# Patient Record
Sex: FEMALE | Race: AMERICAN INDIAN OR ALASKA NATIVE | NOT HISPANIC OR LATINO | Employment: OTHER | URBAN - METROPOLITAN AREA
[De-identification: names, ages, dates, MRNs, and addresses within clinical notes are randomized per-mention and may not be internally consistent; named-entity substitution may affect disease eponyms.]

---

## 2017-04-04 ENCOUNTER — OUTPATIENT INFUSION SERVICES (OUTPATIENT)
Dept: ONCOLOGY | Facility: MEDICAL CENTER | Age: 77
End: 2017-04-04
Attending: INTERNAL MEDICINE
Payer: MEDICARE

## 2017-04-04 VITALS
RESPIRATION RATE: 18 BRPM | SYSTOLIC BLOOD PRESSURE: 151 MMHG | HEIGHT: 62 IN | TEMPERATURE: 98 F | DIASTOLIC BLOOD PRESSURE: 80 MMHG | BODY MASS INDEX: 26.61 KG/M2 | OXYGEN SATURATION: 96 % | HEART RATE: 44 BPM | WEIGHT: 144.62 LBS

## 2017-04-04 LAB
CA-I BLD ISE-SCNC: 1.16 MMOL/L (ref 1.1–1.3)
CREAT BLD-MCNC: 0.9 MG/DL (ref 0.5–1.4)

## 2017-04-04 PROCEDURE — 82565 ASSAY OF CREATININE: CPT

## 2017-04-04 PROCEDURE — 700111 HCHG RX REV CODE 636 W/ 250 OVERRIDE (IP): Performed by: INTERNAL MEDICINE

## 2017-04-04 PROCEDURE — 82330 ASSAY OF CALCIUM: CPT

## 2017-04-04 PROCEDURE — 96401 CHEMO ANTI-NEOPL SQ/IM: CPT

## 2017-04-04 RX ADMIN — DENOSUMAB 60 MG: 60 INJECTION SUBCUTANEOUS at 15:30

## 2017-04-04 ASSESSMENT — PAIN SCALES - GENERAL: PAINLEVEL: NO PAIN

## 2017-04-04 NOTE — PROGRESS NOTES
Pt is here foe her scheduled Prolia. Cr/iCa drawn from R AC with #24G needle; reviewed. Pt states being familiar with the medication and has tolerated it well in the past. Treatment completed without an incident. Discharged home to self care. Pt will schedule her next appointment as needed.

## 2017-04-04 NOTE — PROGRESS NOTES
Pharmacy Note:    Ca = 1.16 mmol/L  SCr = 0.9 mg/dL, est CrCl ~57 mL/min  Last Dose 9/6/16  Okay to receive Prolia today.    Echo Escobedo, PharmD

## 2017-09-08 ENCOUNTER — APPOINTMENT (OUTPATIENT)
Dept: SCHEDULING | Facility: IMAGING CENTER | Age: 77
End: 2017-09-08
Payer: MEDICARE

## 2017-09-25 DIAGNOSIS — C77.3 BREAST CANCER METASTASIZED TO AXILLARY LYMPH NODE, LEFT (HCC): ICD-10-CM

## 2017-09-25 DIAGNOSIS — C50.912 BREAST CANCER METASTASIZED TO AXILLARY LYMPH NODE, LEFT (HCC): ICD-10-CM

## 2017-09-25 PROBLEM — C50.919 BREAST CANCER METASTASIZED TO AXILLARY LYMPH NODE (HCC): Status: ACTIVE | Noted: 2017-09-25

## 2017-10-04 ENCOUNTER — OUTPATIENT INFUSION SERVICES (OUTPATIENT)
Dept: ONCOLOGY | Facility: MEDICAL CENTER | Age: 77
End: 2017-10-04
Attending: INTERNAL MEDICINE
Payer: MEDICARE

## 2017-10-04 VITALS
RESPIRATION RATE: 18 BRPM | SYSTOLIC BLOOD PRESSURE: 149 MMHG | HEART RATE: 76 BPM | OXYGEN SATURATION: 94 % | WEIGHT: 146.16 LBS | BODY MASS INDEX: 27.6 KG/M2 | TEMPERATURE: 97.7 F | DIASTOLIC BLOOD PRESSURE: 74 MMHG | HEIGHT: 61 IN

## 2017-10-04 DIAGNOSIS — C50.912 BREAST CANCER METASTASIZED TO AXILLARY LYMPH NODE, LEFT (HCC): ICD-10-CM

## 2017-10-04 DIAGNOSIS — C77.3 BREAST CANCER METASTASIZED TO AXILLARY LYMPH NODE, LEFT (HCC): ICD-10-CM

## 2017-10-04 LAB
CA-I BLD ISE-SCNC: 1.23 MMOL/L (ref 1.1–1.3)
CREAT BLD-MCNC: 0.7 MG/DL (ref 0.5–1.4)

## 2017-10-04 PROCEDURE — 82565 ASSAY OF CREATININE: CPT

## 2017-10-04 PROCEDURE — 96401 CHEMO ANTI-NEOPL SQ/IM: CPT

## 2017-10-04 PROCEDURE — 82330 ASSAY OF CALCIUM: CPT

## 2017-10-04 PROCEDURE — 700111 HCHG RX REV CODE 636 W/ 250 OVERRIDE (IP): Performed by: INTERNAL MEDICINE

## 2017-10-04 RX ADMIN — DENOSUMAB 60 MG: 60 INJECTION SUBCUTANEOUS at 15:44

## 2017-10-04 ASSESSMENT — PAIN SCALES - GENERAL: PAINLEVEL: 1=MINIMAL PAIN

## 2017-10-04 NOTE — PROGRESS NOTES
Pharmacy Note:    Scr = 0.7    with estimated CrCl ~73 ml/min  Ionized Serum Calcium =   1.23 mmol/L    Last given 4/4/17  OK to proceed with Prolia injection    Robb AlbertD

## 2017-10-05 NOTE — PROGRESS NOTES
Patient presents for Prolia injection. Reviewed plan of care, patient verbalizes understanding. Labs drawn as ordered. Results within parameters to proceed. Prolia given with no new patient complaints. Patient scheduled for next appointment and released in no acute distress.

## 2018-04-09 ENCOUNTER — OUTPATIENT INFUSION SERVICES (OUTPATIENT)
Dept: ONCOLOGY | Facility: MEDICAL CENTER | Age: 78
End: 2018-04-09
Attending: INTERNAL MEDICINE
Payer: MEDICARE

## 2018-04-09 VITALS
RESPIRATION RATE: 18 BRPM | TEMPERATURE: 98.9 F | BODY MASS INDEX: 27.22 KG/M2 | SYSTOLIC BLOOD PRESSURE: 112 MMHG | WEIGHT: 144.18 LBS | HEIGHT: 61 IN | DIASTOLIC BLOOD PRESSURE: 55 MMHG | OXYGEN SATURATION: 94 % | HEART RATE: 61 BPM

## 2018-04-09 DIAGNOSIS — C77.3 BREAST CANCER METASTASIZED TO AXILLARY LYMPH NODE, LEFT (HCC): ICD-10-CM

## 2018-04-09 DIAGNOSIS — C50.912 BREAST CANCER METASTASIZED TO AXILLARY LYMPH NODE, LEFT (HCC): ICD-10-CM

## 2018-04-09 LAB
CA-I BLD ISE-SCNC: 1.13 MMOL/L (ref 1.1–1.3)
CREAT BLD-MCNC: 0.6 MG/DL (ref 0.5–1.4)

## 2018-04-09 PROCEDURE — 82565 ASSAY OF CREATININE: CPT

## 2018-04-09 PROCEDURE — 82330 ASSAY OF CALCIUM: CPT

## 2018-04-09 PROCEDURE — 96401 CHEMO ANTI-NEOPL SQ/IM: CPT

## 2018-04-09 PROCEDURE — 700111 HCHG RX REV CODE 636 W/ 250 OVERRIDE (IP): Mod: JG | Performed by: INTERNAL MEDICINE

## 2018-04-09 RX ADMIN — DENOSUMAB 60 MG: 60 INJECTION SUBCUTANEOUS at 13:53

## 2018-04-09 ASSESSMENT — PAIN SCALES - GENERAL: PAINLEVEL: NO PAIN

## 2018-04-09 NOTE — PROGRESS NOTES
Pharmacy note  Cr = 0.6 mg/dL, CrCl ~ 72 ml/min (min SCr 0.7 mg/dL)  Ionized Ca = 1.13  Last Prolia 10/04/17  OK for denosumab (Prolia) 60 mg SQ today    Gen Kerns, PharmD, BCOP

## 2018-04-09 NOTE — PROGRESS NOTES
Pt arrived to [facility], ambulatory, for Prolia injection. Pt voices no complaints. Pt denies any recent or upcoming dental surgeries. Labs drawn and reviewed, pt within parameters to treat today. Prolia injected into the R-upper arm with no s/sx of adverse reaction. Pt left [facility] with no s/sx of distress. Follow up appointment confirmed.

## 2018-07-27 ENCOUNTER — TELEPHONE (OUTPATIENT)
Dept: RADIOLOGY | Facility: MEDICAL CENTER | Age: 78
End: 2018-07-27

## 2018-07-27 NOTE — TELEPHONE ENCOUNTER
ADVISED PT OF ABN POPUP ON DEXA ORDER (DIAGNOSIS CODE MAY NOT BE COVERED BY MEDICARE); I WILL TRY TO GET NEW CODE FROM PROVIDER'S OFFICE/TML

## 2018-08-03 ENCOUNTER — HOSPITAL ENCOUNTER (OUTPATIENT)
Dept: RADIOLOGY | Facility: MEDICAL CENTER | Age: 78
End: 2018-08-03
Attending: INTERNAL MEDICINE
Payer: MEDICARE

## 2018-08-03 DIAGNOSIS — Z78.0 POSTMENOPAUSAL: ICD-10-CM

## 2018-08-03 DIAGNOSIS — C50.412 MALIGNANT NEOPLASM OF UPPER-OUTER QUADRANT OF LEFT FEMALE BREAST, UNSPECIFIED ESTROGEN RECEPTOR STATUS (HCC): ICD-10-CM

## 2018-08-03 DIAGNOSIS — Z85.3 PERSONAL HISTORY OF MALIGNANT NEOPLASM OF BREAST: ICD-10-CM

## 2018-08-03 PROCEDURE — 77080 DXA BONE DENSITY AXIAL: CPT

## 2019-05-09 ENCOUNTER — OUTPATIENT INFUSION SERVICES (OUTPATIENT)
Dept: ONCOLOGY | Facility: MEDICAL CENTER | Age: 79
End: 2019-05-09
Attending: INTERNAL MEDICINE
Payer: MEDICARE

## 2019-05-09 VITALS
RESPIRATION RATE: 18 BRPM | HEIGHT: 61 IN | WEIGHT: 139.55 LBS | OXYGEN SATURATION: 93 % | HEART RATE: 64 BPM | BODY MASS INDEX: 26.35 KG/M2 | DIASTOLIC BLOOD PRESSURE: 71 MMHG | TEMPERATURE: 97.3 F | SYSTOLIC BLOOD PRESSURE: 135 MMHG

## 2019-05-09 DIAGNOSIS — C50.912 BREAST CANCER METASTASIZED TO AXILLARY LYMPH NODE, LEFT (HCC): ICD-10-CM

## 2019-05-09 DIAGNOSIS — C77.3 BREAST CANCER METASTASIZED TO AXILLARY LYMPH NODE, LEFT (HCC): ICD-10-CM

## 2019-05-09 LAB
CA-I BLD ISE-SCNC: 1.23 MMOL/L (ref 1.1–1.3)
CREAT BLD-MCNC: 0.7 MG/DL (ref 0.5–1.4)

## 2019-05-09 PROCEDURE — 96372 THER/PROPH/DIAG INJ SC/IM: CPT

## 2019-05-09 PROCEDURE — 36415 COLL VENOUS BLD VENIPUNCTURE: CPT

## 2019-05-09 PROCEDURE — 82330 ASSAY OF CALCIUM: CPT

## 2019-05-09 PROCEDURE — 82565 ASSAY OF CREATININE: CPT

## 2019-05-09 PROCEDURE — 700111 HCHG RX REV CODE 636 W/ 250 OVERRIDE (IP): Mod: JG | Performed by: INTERNAL MEDICINE

## 2019-05-09 RX ADMIN — DENOSUMAB 60 MG: 60 INJECTION SUBCUTANEOUS at 14:51

## 2019-05-09 NOTE — PROGRESS NOTES
Pharmacy note    Ionized Ca = 1.23  Cr = 0.7, CrCl ~ 68 ml/min  OK for denosumab (Prolia) 60 mg SQ today    Last tx = 4/9/18      Nina Brizuela, PharmD

## 2019-05-09 NOTE — PROGRESS NOTES
Pt returns to infusion center for Prolia injection.  Pt reports having had injection previously without incident.  Labs drawn via #23 butterfly needle to RAC; pt tolerated well.  Results reviewed.  Pt tolerated injection without incident.  Pt left infusion center ambulatory and in good condition.  Returns in six months, appointment scheduled.

## 2019-11-11 ENCOUNTER — OUTPATIENT INFUSION SERVICES (OUTPATIENT)
Dept: ONCOLOGY | Facility: MEDICAL CENTER | Age: 79
End: 2019-11-11
Attending: INTERNAL MEDICINE
Payer: MEDICARE

## 2019-11-11 VITALS
HEIGHT: 61 IN | BODY MASS INDEX: 26.43 KG/M2 | RESPIRATION RATE: 18 BRPM | DIASTOLIC BLOOD PRESSURE: 72 MMHG | SYSTOLIC BLOOD PRESSURE: 149 MMHG | TEMPERATURE: 98.2 F | WEIGHT: 139.99 LBS | HEART RATE: 47 BPM

## 2019-11-11 DIAGNOSIS — C77.3 BREAST CANCER METASTASIZED TO AXILLARY LYMPH NODE, LEFT (HCC): ICD-10-CM

## 2019-11-11 DIAGNOSIS — C50.912 BREAST CANCER METASTASIZED TO AXILLARY LYMPH NODE, LEFT (HCC): ICD-10-CM

## 2019-11-11 LAB
CA-I BLD ISE-SCNC: 1.26 MMOL/L (ref 1.1–1.3)
CREAT BLD-MCNC: 0.6 MG/DL (ref 0.5–1.4)

## 2019-11-11 PROCEDURE — 700111 HCHG RX REV CODE 636 W/ 250 OVERRIDE (IP): Mod: JG | Performed by: INTERNAL MEDICINE

## 2019-11-11 PROCEDURE — 82330 ASSAY OF CALCIUM: CPT

## 2019-11-11 PROCEDURE — 96372 THER/PROPH/DIAG INJ SC/IM: CPT

## 2019-11-11 PROCEDURE — 82565 ASSAY OF CREATININE: CPT

## 2019-11-11 PROCEDURE — 36415 COLL VENOUS BLD VENIPUNCTURE: CPT

## 2019-11-11 RX ADMIN — DENOSUMAB 60 MG: 60 INJECTION SUBCUTANEOUS at 15:38

## 2019-11-11 NOTE — PROGRESS NOTES
Pharmacy Note:    Scr = 0.6 mg/dL    with est crcl ~ 67.3 ml/min (minimum SCr of 0.7)  Ionized Calcium = 1.26 mmol/L    Ok to proceed with prolia injection    Tristan Meza, PharmD

## 2019-11-11 NOTE — PROGRESS NOTES
Pt arrived ambulatory to [facility] for Prolia injection. Pt denied current infections, s/sx of hypocalcemia or recent/planned oral surgery.  Venipuncture right AC with 25g butterfly.  Istat Creatinine and Ionized Calcium drawn. Ionized Calcium = 1.26, within treatable parameters.  Pt received Prolia as ordered.  Left [facility] in NAD, copy of next appointment given to patient.

## 2020-05-11 ENCOUNTER — OUTPATIENT INFUSION SERVICES (OUTPATIENT)
Dept: ONCOLOGY | Facility: MEDICAL CENTER | Age: 80
End: 2020-05-11
Attending: INTERNAL MEDICINE
Payer: MEDICARE

## 2020-05-11 VITALS
RESPIRATION RATE: 18 BRPM | TEMPERATURE: 98.2 F | HEART RATE: 63 BPM | DIASTOLIC BLOOD PRESSURE: 64 MMHG | HEIGHT: 61 IN | OXYGEN SATURATION: 93 % | WEIGHT: 139.55 LBS | SYSTOLIC BLOOD PRESSURE: 142 MMHG | BODY MASS INDEX: 26.35 KG/M2

## 2020-05-11 DIAGNOSIS — C50.912 BREAST CANCER METASTASIZED TO AXILLARY LYMPH NODE, LEFT (HCC): ICD-10-CM

## 2020-05-11 DIAGNOSIS — C77.3 BREAST CANCER METASTASIZED TO AXILLARY LYMPH NODE, LEFT (HCC): ICD-10-CM

## 2020-05-11 LAB
CA-I BLD ISE-SCNC: 1.09 MMOL/L (ref 1.1–1.3)
CREAT BLD-MCNC: 0.7 MG/DL (ref 0.5–1.4)

## 2020-05-11 PROCEDURE — 96372 THER/PROPH/DIAG INJ SC/IM: CPT

## 2020-05-11 PROCEDURE — 700111 HCHG RX REV CODE 636 W/ 250 OVERRIDE (IP): Mod: JG | Performed by: INTERNAL MEDICINE

## 2020-05-11 PROCEDURE — 82330 ASSAY OF CALCIUM: CPT

## 2020-05-11 PROCEDURE — 36415 COLL VENOUS BLD VENIPUNCTURE: CPT

## 2020-05-11 PROCEDURE — 82565 ASSAY OF CREATININE: CPT

## 2020-05-11 RX ADMIN — DENOSUMAB 60 MG: 60 INJECTION SUBCUTANEOUS at 14:43

## 2020-05-11 NOTE — PROGRESS NOTES
Pharmacy Note:    Scr = 0.7mg/dL    with est crcl ~ 67mL/min  Ionized Calcium = 1.09mmol/L  Asymptomatic and reports taking the recommended daily doses of calcium and vitamin D.    Previous treatment = 11/11/19  Ok to proceed with prolia injection    Scot Goldsmith, RobbD

## 2020-05-11 NOTE — PROGRESS NOTES
Late entry for 1443:  Prolia administered per MD orders to R back of arm via subcutaneous injection.  Pt tolerated injection well and without incident.  Band aid applied to injection site.  Pt left infusion services in no apparent distress after completion of treatment, ambulatory.  Next appointment scheduled.

## 2020-05-11 NOTE — LETTER
Infusion Services   [address] [place] [address]  [place] [address]  Phone: [phone]  Fax: [phone]              Dear Dr. Gonsalves,    Your patient, Fatuma Moe (: 1940), was scheduled at [facility].  Fatuma's encounter diagnosis is:  1. Breast cancer metastasized to axillary lymph node, left (HCC)  ISTAT CREATININE    ISTAT IONIZED CA    Nursing Communication    ISTAT CREATININE    ISTAT IONIZED CA    denosumab (PROLIA) subq injection 60 mg    Calcium monitoring per protocol    Treatment Hold/Call Parameters    Calcium monitoring per protocol    Treatment Hold/Call Parameters     She arrived for her appointment, and  the scheduled treatment was   given. These medications were administered to the patient: We administered denosumab..  Fatuma Moe  tolerated treatment. In addition, the following labs were drawn    Recent Results (from the past 24 hour(s))   ISTAT CREATININE    Collection Time: 20  2:32 PM   Result Value Ref Range    Istat Creatinine 0.7 0.5 - 1.4 mg/dL   ISTAT IONIZED CA    Collection Time: 20  2:32 PM   Result Value Ref Range    Istat Ionized Calcium 1.09 (L) 1.10 - 1.30 mmol/L            Her next appointment is rescheduled for 2020 .    For more information, you may review the nurse's progress notes in chart review under the notes section.       Sincerely,  Infusion [facility]

## 2020-05-11 NOTE — PROGRESS NOTES
Pt to infusion services ambulatory per self.  Pt here for scheduled Prolia injection.  Plan of care reviewed.  Pt verbalizes understanding.  Pt reports no issues or concerns.  Pt denies any s/sx of infection, recent or upcoming dental procedures or oral surgery.  Lab drawn from R- using #25g BD needle.  Pt tolerated well.  Pressure dressing applied.  Ionized calcium = 1.09.  Pt denies any s/sx of hypocalcemia.  Chart to pharmacy.

## 2020-11-11 ENCOUNTER — HOSPITAL ENCOUNTER (OUTPATIENT)
Dept: RADIOLOGY | Facility: MEDICAL CENTER | Age: 80
End: 2020-11-11
Attending: INTERNAL MEDICINE
Payer: MEDICARE

## 2020-11-11 ENCOUNTER — OUTPATIENT INFUSION SERVICES (OUTPATIENT)
Dept: ONCOLOGY | Facility: MEDICAL CENTER | Age: 80
End: 2020-11-11
Attending: INTERNAL MEDICINE
Payer: MEDICARE

## 2020-11-11 VITALS
HEART RATE: 58 BPM | BODY MASS INDEX: 26.64 KG/M2 | TEMPERATURE: 98.8 F | DIASTOLIC BLOOD PRESSURE: 59 MMHG | HEIGHT: 61 IN | RESPIRATION RATE: 18 BRPM | WEIGHT: 141.09 LBS | OXYGEN SATURATION: 96 % | SYSTOLIC BLOOD PRESSURE: 147 MMHG

## 2020-11-11 DIAGNOSIS — Z85.3 PERSONAL HISTORY OF MALIGNANT NEOPLASM OF BREAST: ICD-10-CM

## 2020-11-11 DIAGNOSIS — M85.89 OTHER SPECIFIED DISORDERS OF BONE DENSITY AND STRUCTURE, MULTIPLE SITES: ICD-10-CM

## 2020-11-11 DIAGNOSIS — M85.9 DISORDER OF BONE DENSITY AND STRUCTURE, UNSPECIFIED: ICD-10-CM

## 2020-11-11 DIAGNOSIS — C50.912 BREAST CANCER METASTASIZED TO AXILLARY LYMPH NODE, LEFT (HCC): ICD-10-CM

## 2020-11-11 DIAGNOSIS — C77.3 BREAST CANCER METASTASIZED TO AXILLARY LYMPH NODE, LEFT (HCC): ICD-10-CM

## 2020-11-11 DIAGNOSIS — C50.412 MALIGNANT NEOPLASM OF UPPER-OUTER QUADRANT OF LEFT FEMALE BREAST, UNSPECIFIED ESTROGEN RECEPTOR STATUS (HCC): ICD-10-CM

## 2020-11-11 LAB
CA-I BLD ISE-SCNC: 1.23 MMOL/L (ref 1.1–1.3)
CREAT BLD-MCNC: 0.6 MG/DL (ref 0.5–1.4)

## 2020-11-11 PROCEDURE — 82330 ASSAY OF CALCIUM: CPT

## 2020-11-11 PROCEDURE — 36415 COLL VENOUS BLD VENIPUNCTURE: CPT

## 2020-11-11 PROCEDURE — 77080 DXA BONE DENSITY AXIAL: CPT

## 2020-11-11 PROCEDURE — 700111 HCHG RX REV CODE 636 W/ 250 OVERRIDE (IP): Mod: JG | Performed by: INTERNAL MEDICINE

## 2020-11-11 PROCEDURE — 96372 THER/PROPH/DIAG INJ SC/IM: CPT

## 2020-11-11 PROCEDURE — 82565 ASSAY OF CREATININE: CPT

## 2020-11-11 RX ADMIN — DENOSUMAB 60 MG: 60 INJECTION SUBCUTANEOUS at 14:46

## 2020-11-11 NOTE — PROGRESS NOTES
Pt arrived for first time Prolia injection  POC discussed with patient and pt agrees. Pt denies any s/s infection, dental surgeries in the past 4 weeks or upcoming dental procedures, denies s/s of hypocalcimia.  Pt confirms taking calcuim/vitamin D at home.    Istat Ca/Cr lab drawn from R AC.  Lab parameters met, Prolia injected to back R arm, bandaid in place.  Pt tolerated well.  Pt discharged to self care in [facility], will call to schedule next appointment.

## 2021-05-12 ENCOUNTER — APPOINTMENT (OUTPATIENT)
Dept: ONCOLOGY | Facility: MEDICAL CENTER | Age: 81
End: 2021-05-12
Attending: INTERNAL MEDICINE
Payer: MEDICARE

## 2021-06-03 ENCOUNTER — OUTPATIENT INFUSION SERVICES (OUTPATIENT)
Dept: ONCOLOGY | Facility: MEDICAL CENTER | Age: 81
End: 2021-06-03
Attending: INTERNAL MEDICINE
Payer: MEDICARE

## 2021-06-03 VITALS
HEART RATE: 97 BPM | WEIGHT: 138.45 LBS | BODY MASS INDEX: 25.48 KG/M2 | TEMPERATURE: 97.4 F | RESPIRATION RATE: 18 BRPM | DIASTOLIC BLOOD PRESSURE: 61 MMHG | OXYGEN SATURATION: 92 % | HEIGHT: 62 IN | SYSTOLIC BLOOD PRESSURE: 150 MMHG

## 2021-06-03 DIAGNOSIS — C77.3 BREAST CANCER METASTASIZED TO AXILLARY LYMPH NODE, LEFT (HCC): ICD-10-CM

## 2021-06-03 DIAGNOSIS — C50.912 BREAST CANCER METASTASIZED TO AXILLARY LYMPH NODE, LEFT (HCC): ICD-10-CM

## 2021-06-03 LAB
CA-I BLD ISE-SCNC: 1.3 MMOL/L (ref 1.1–1.3)
CREAT BLD-MCNC: 0.7 MG/DL (ref 0.5–1.4)

## 2021-06-03 PROCEDURE — 36415 COLL VENOUS BLD VENIPUNCTURE: CPT

## 2021-06-03 PROCEDURE — 82565 ASSAY OF CREATININE: CPT

## 2021-06-03 PROCEDURE — 96372 THER/PROPH/DIAG INJ SC/IM: CPT

## 2021-06-03 PROCEDURE — 82330 ASSAY OF CALCIUM: CPT

## 2021-06-03 PROCEDURE — 700111 HCHG RX REV CODE 636 W/ 250 OVERRIDE (IP): Mod: JG | Performed by: INTERNAL MEDICINE

## 2021-06-03 RX ADMIN — DENOSUMAB 60 MG: 60 INJECTION SUBCUTANEOUS at 13:44

## 2021-06-03 NOTE — PROGRESS NOTES
Elvarine into [facility]fusion [facility] for a Prolia injection.  Pt denied having any new complaints, acute infections, or dental procedures in the last month or scheduled for the next month. 23G butterfly needle used to draw blood from right AC, bleeding controlled with gauze and coban after. Ionized calcium/creatinine tested, WNL. Prolia injection given in the back of right arm SQ. Elvarine tolerated well, adhesive bandage applied to injection site. Next appointment scheduled. Discharged to self care; no apparent distress noted.

## 2022-01-03 ENCOUNTER — OUTPATIENT INFUSION SERVICES (OUTPATIENT)
Dept: ONCOLOGY | Facility: MEDICAL CENTER | Age: 82
End: 2022-01-03
Attending: INTERNAL MEDICINE
Payer: MEDICARE

## 2022-01-03 ENCOUNTER — TELEPHONE (OUTPATIENT)
Dept: ONCOLOGY | Facility: MEDICAL CENTER | Age: 82
End: 2022-01-03

## 2022-01-03 VITALS
RESPIRATION RATE: 18 BRPM | OXYGEN SATURATION: 92 % | TEMPERATURE: 97 F | DIASTOLIC BLOOD PRESSURE: 71 MMHG | HEIGHT: 61 IN | WEIGHT: 141.98 LBS | BODY MASS INDEX: 26.81 KG/M2 | HEART RATE: 45 BPM | SYSTOLIC BLOOD PRESSURE: 164 MMHG

## 2022-01-03 DIAGNOSIS — C50.912 BREAST CANCER METASTASIZED TO AXILLARY LYMPH NODE, LEFT (HCC): ICD-10-CM

## 2022-01-03 DIAGNOSIS — C77.3 BREAST CANCER METASTASIZED TO AXILLARY LYMPH NODE, LEFT (HCC): ICD-10-CM

## 2022-01-03 LAB
CA-I BLD ISE-SCNC: 1.31 MMOL/L (ref 1.1–1.3)
CREAT BLD-MCNC: 0.6 MG/DL (ref 0.5–1.4)

## 2022-01-03 PROCEDURE — 700111 HCHG RX REV CODE 636 W/ 250 OVERRIDE (IP): Mod: JG | Performed by: INTERNAL MEDICINE

## 2022-01-03 PROCEDURE — 82330 ASSAY OF CALCIUM: CPT

## 2022-01-03 PROCEDURE — 82565 ASSAY OF CREATININE: CPT

## 2022-01-03 PROCEDURE — 36415 COLL VENOUS BLD VENIPUNCTURE: CPT

## 2022-01-03 PROCEDURE — 96372 THER/PROPH/DIAG INJ SC/IM: CPT

## 2022-01-03 RX ADMIN — DENOSUMAB 60 MG: 60 INJECTION SUBCUTANEOUS at 11:02

## 2022-01-03 NOTE — PROGRESS NOTES
Fatuma into [facility]fusion [facility] for a Prolia injection Pt denied having any new complaints, acute infections, or dental procedures in the last month or scheduled for the next month. Fatuma denies having and questions at this time. 25G butterfly needle used to draw blood from RAC, bleeding controlled with gauze and coban after. Ionized calcium/creatinine tested, WNL, pharmacist notified that Pt within parameters to treat.  Markvarine aware to continue taking vitamin D and calcium supplements. Prolia injection given in upper back of right arm SQ. Pt tolerated well, band-aid applied to injection site. Future appointments confirmed with Pt prior to leaving, Markrex discharged home to self care.

## 2022-07-05 ENCOUNTER — TELEPHONE (OUTPATIENT)
Dept: ONCOLOGY | Facility: MEDICAL CENTER | Age: 82
End: 2022-07-05
Payer: MEDICARE

## 2022-07-10 ENCOUNTER — OUTPATIENT INFUSION SERVICES (OUTPATIENT)
Dept: ONCOLOGY | Facility: MEDICAL CENTER | Age: 82
End: 2022-07-10
Attending: INTERNAL MEDICINE
Payer: MEDICARE

## 2022-07-10 VITALS
BODY MASS INDEX: 25.47 KG/M2 | HEART RATE: 47 BPM | WEIGHT: 134.92 LBS | DIASTOLIC BLOOD PRESSURE: 72 MMHG | OXYGEN SATURATION: 94 % | RESPIRATION RATE: 18 BRPM | TEMPERATURE: 98.2 F | SYSTOLIC BLOOD PRESSURE: 145 MMHG

## 2022-07-10 DIAGNOSIS — C50.912 BREAST CANCER METASTASIZED TO AXILLARY LYMPH NODE, LEFT (HCC): ICD-10-CM

## 2022-07-10 DIAGNOSIS — C77.3 BREAST CANCER METASTASIZED TO AXILLARY LYMPH NODE, LEFT (HCC): ICD-10-CM

## 2022-07-10 LAB
CA-I BLD ISE-SCNC: 1.27 MMOL/L (ref 1.1–1.3)
CREAT BLD-MCNC: 0.7 MG/DL (ref 0.5–1.4)

## 2022-07-10 PROCEDURE — 82565 ASSAY OF CREATININE: CPT

## 2022-07-10 PROCEDURE — 700111 HCHG RX REV CODE 636 W/ 250 OVERRIDE (IP): Mod: JG | Performed by: INTERNAL MEDICINE

## 2022-07-10 PROCEDURE — 96372 THER/PROPH/DIAG INJ SC/IM: CPT

## 2022-07-10 PROCEDURE — 36415 COLL VENOUS BLD VENIPUNCTURE: CPT

## 2022-07-10 PROCEDURE — 82330 ASSAY OF CALCIUM: CPT

## 2022-07-10 RX ADMIN — DENOSUMAB 60 MG: 60 INJECTION SUBCUTANEOUS at 14:25

## 2022-07-10 NOTE — PROGRESS NOTES
Fatuma into [facility] for a Prolia injection.  Pt denied having any new complaints, acute infections, or dental procedures in the last month or scheduled for the next month. 23G butterfly needle used to draw blood from RAC, bleeding controlled with gauze and coban after. Ionized calcium/creatinine tested, WNL, pharmacist notified that Pt within parameters to treat.  Markvarine aware to continue taking vitamin D and calcium supplements. Prolia injection given in upper back of right arm SQ. Pt tolerated well, band-aid applied to injection site. Future appointments confirmed with Pt prior to leaving, Fatuma discharged home to self care.